# Patient Record
Sex: MALE | Race: WHITE | URBAN - METROPOLITAN AREA
[De-identification: names, ages, dates, MRNs, and addresses within clinical notes are randomized per-mention and may not be internally consistent; named-entity substitution may affect disease eponyms.]

---

## 2021-01-12 ENCOUNTER — NEW PATIENT (OUTPATIENT)
Dept: URBAN - METROPOLITAN AREA CLINIC 30 | Facility: CLINIC | Age: 46
End: 2021-01-12
Payer: COMMERCIAL

## 2021-01-12 DIAGNOSIS — H50.00 UNSPECIFIED ESOTROPIA: ICD-10-CM

## 2021-01-12 DIAGNOSIS — H04.123 TEAR FILM INSUFFICIENCY OF BILATERAL LACRIMAL GLANDS: ICD-10-CM

## 2021-01-12 DIAGNOSIS — Z83.511 FAMILY HISTORY OF GLAUCOMA: Primary | ICD-10-CM

## 2021-01-12 PROCEDURE — 92133 CPTRZD OPH DX IMG PST SGM ON: CPT | Performed by: OPTOMETRIST

## 2021-01-12 PROCEDURE — 92004 COMPRE OPH EXAM NEW PT 1/>: CPT | Performed by: OPTOMETRIST

## 2021-01-12 ASSESSMENT — INTRAOCULAR PRESSURE
OS: 14
OD: 14

## 2021-01-12 ASSESSMENT — VISUAL ACUITY
OD: 20/20
OS: 20/20

## 2021-01-12 ASSESSMENT — KERATOMETRY
OS: 44.15
OD: 43.92

## 2022-10-19 ENCOUNTER — OFFICE VISIT (OUTPATIENT)
Dept: URBAN - METROPOLITAN AREA CLINIC 30 | Facility: CLINIC | Age: 47
End: 2022-10-19
Payer: COMMERCIAL

## 2022-10-19 DIAGNOSIS — H50.00 UNSPECIFIED ESOTROPIA: ICD-10-CM

## 2022-10-19 DIAGNOSIS — H04.123 TEAR FILM INSUFFICIENCY OF BILATERAL LACRIMAL GLANDS: Primary | ICD-10-CM

## 2022-10-19 DIAGNOSIS — H52.4 PRESBYOPIA: ICD-10-CM

## 2022-10-19 PROCEDURE — 92014 COMPRE OPH EXAM EST PT 1/>: CPT | Performed by: OPTOMETRIST

## 2022-10-19 ASSESSMENT — KERATOMETRY
OD: 43.84
OS: 44.23

## 2022-10-19 ASSESSMENT — INTRAOCULAR PRESSURE
OS: 16
OD: 16

## 2022-10-19 ASSESSMENT — VISUAL ACUITY
OD: 20/20
OS: 20/20

## 2022-10-19 NOTE — IMPRESSION/PLAN
Impression: Tear film insufficiency of bilateral lacrimal glands: H04.123. +CPAP since 2017 Plan: Using ATs TID-QID OU- Systane. Rec start martha qhs OU due to air escape from CPAP. Pt also notes use of allergy meds prn, Zyrtec, Claritin, and Flonase prn ~ 2x/week. Rec humidifier. Stay hydrated. Previous Lumify and Naphcon A use- pt notes no longer using either. Pt working to lose weight, hopes to be able to d/c CPAP. Call if NI in dryness after trying martha at night.

## 2022-10-19 NOTE — IMPRESSION/PLAN
Impression: Esotropia Plan: OD per patient Hx. Hx of strabismus surgery. CT 1/21 appears ortho at distance. Denies diplopia.

## 2024-01-10 ENCOUNTER — OFFICE VISIT (OUTPATIENT)
Dept: URBAN - METROPOLITAN AREA CLINIC 30 | Facility: CLINIC | Age: 49
End: 2024-01-10

## 2024-01-10 DIAGNOSIS — Z83.511 FAMILY HISTORY OF GLAUCOMA: ICD-10-CM

## 2024-01-10 DIAGNOSIS — H04.123 TEAR FILM INSUFFICIENCY OF BILATERAL LACRIMAL GLANDS: Primary | ICD-10-CM

## 2024-01-10 DIAGNOSIS — H50.00 UNSPECIFIED ESOTROPIA: ICD-10-CM

## 2024-01-10 DIAGNOSIS — H52.4 PRESBYOPIA: ICD-10-CM

## 2024-01-10 PROCEDURE — 92014 COMPRE OPH EXAM EST PT 1/>: CPT | Performed by: OPTOMETRIST

## 2024-01-10 ASSESSMENT — INTRAOCULAR PRESSURE
OD: 18
OS: 18

## 2024-01-10 ASSESSMENT — VISUAL ACUITY
OS: 20/20
OD: 20/20

## 2024-01-10 ASSESSMENT — KERATOMETRY
OS: 44.41
OD: 44.15